# Patient Record
Sex: FEMALE | Race: WHITE
[De-identification: names, ages, dates, MRNs, and addresses within clinical notes are randomized per-mention and may not be internally consistent; named-entity substitution may affect disease eponyms.]

---

## 2017-04-25 ENCOUNTER — OTHER (OUTPATIENT)
Age: 37
End: 2017-04-25

## 2017-04-30 ENCOUNTER — RESULT REVIEW (OUTPATIENT)
Age: 37
End: 2017-04-30

## 2017-06-14 ENCOUNTER — APPOINTMENT (OUTPATIENT)
Dept: BREAST CENTER | Facility: CLINIC | Age: 37
End: 2017-06-14

## 2017-06-14 VITALS
BODY MASS INDEX: 19.32 KG/M2 | HEIGHT: 62 IN | DIASTOLIC BLOOD PRESSURE: 82 MMHG | SYSTOLIC BLOOD PRESSURE: 110 MMHG | HEART RATE: 90 BPM | WEIGHT: 105 LBS

## 2017-06-14 DIAGNOSIS — N60.19 DIFFUSE CYSTIC MASTOPATHY OF UNSPECIFIED BREAST: ICD-10-CM

## 2017-06-14 DIAGNOSIS — R92.8 OTHER ABNORMAL AND INCONCLUSIVE FINDINGS ON DIAGNOSTIC IMAGING OF BREAST: ICD-10-CM

## 2017-10-17 ENCOUNTER — OTHER (OUTPATIENT)
Age: 37
End: 2017-10-17

## 2017-11-20 ENCOUNTER — OTHER (OUTPATIENT)
Age: 37
End: 2017-11-20

## 2018-10-15 ENCOUNTER — OTHER (OUTPATIENT)
Age: 38
End: 2018-10-15

## 2020-06-05 ENCOUNTER — APPOINTMENT (OUTPATIENT)
Dept: PHYSICAL MEDICINE AND REHAB | Facility: CLINIC | Age: 40
End: 2020-06-05

## 2023-05-31 ENCOUNTER — FORM ENCOUNTER (OUTPATIENT)
Age: 43
End: 2023-05-31

## 2023-06-01 ENCOUNTER — APPOINTMENT (OUTPATIENT)
Dept: ORTHOPEDIC SURGERY | Facility: CLINIC | Age: 43
End: 2023-06-01
Payer: COMMERCIAL

## 2023-06-01 ENCOUNTER — APPOINTMENT (OUTPATIENT)
Dept: MRI IMAGING | Facility: CLINIC | Age: 43
End: 2023-06-01
Payer: COMMERCIAL

## 2023-06-01 VITALS — WEIGHT: 105 LBS | HEIGHT: 62 IN | BODY MASS INDEX: 19.32 KG/M2

## 2023-06-01 DIAGNOSIS — M54.16 RADICULOPATHY, LUMBAR REGION: ICD-10-CM

## 2023-06-01 PROCEDURE — 72100 X-RAY EXAM L-S SPINE 2/3 VWS: CPT

## 2023-06-01 PROCEDURE — 72141 MRI NECK SPINE W/O DYE: CPT

## 2023-06-01 PROCEDURE — 72040 X-RAY EXAM NECK SPINE 2-3 VW: CPT

## 2023-06-01 PROCEDURE — 99204 OFFICE O/P NEW MOD 45 MIN: CPT

## 2023-06-01 RX ORDER — METHYLPREDNISOLONE 4 MG/1
4 TABLET ORAL
Qty: 1 | Refills: 0 | Status: ACTIVE | COMMUNITY
Start: 2023-06-01 | End: 1900-01-01

## 2023-06-01 RX ORDER — CYCLOBENZAPRINE HYDROCHLORIDE 10 MG/1
10 TABLET, FILM COATED ORAL 3 TIMES DAILY
Qty: 60 | Refills: 0 | Status: ACTIVE | COMMUNITY
Start: 2023-06-01 | End: 1900-01-01

## 2023-06-02 ENCOUNTER — TRANSCRIPTION ENCOUNTER (OUTPATIENT)
Age: 43
End: 2023-06-02

## 2023-06-02 ENCOUNTER — APPOINTMENT (OUTPATIENT)
Dept: ORTHOPEDIC SURGERY | Facility: CLINIC | Age: 43
End: 2023-06-02

## 2023-06-02 NOTE — PHYSICAL EXAM
[Rotation to left] : rotation to left [Rotation to right] : rotation to right [4___] : left hip flexion 4[unfilled]/5 [de-identified] : Constitutional:\par - General Appearance:\par Unremarkable\par Body Habitus\par Well Developed\par Well Nourished\par Body Habitus\par No Deformities\par Well Groomed\par Ability To communicate:\par Normal\par Neurologic:\par Global sensation is intact to upper and lower extremities. See examination of Neck and/or Spine\par for exceptions.\par Orientation to Time, Place and Person is: Normal\par Mood And Affect is Normal\par Skin:\par - Head/Face, Right Upper/Lower Extremity, Left Upper/Lower Extremity: Normal\par See Examination of Neck and/or Spine for exceptions\par Cardiovascular:\par Peripheral Cardiovascular System is Normal\par Palpation of Lymph Nodes:\par Normal Palpation of lymph nodes in: Axilla, Cervical, Inguinal\par Abnormal Palpation of lymph nodes in: None  [FreeTextEntry9] : Full ROM w/ mild pain of L shoulder.  [TWNoteComboBox7] : forward flexion 20 degrees [de-identified] : extension 0 degrees [de-identified] : left lateral rotation 5 degrees [TWNoteComboBox6] : right lateral rotation 25 degrees [] : non-antalgic [FreeTextEntry8] : paraesthesias in L2 region b/l. negative cook b/l [de-identified] : +1 Achilles  [FreeTextEntry1] : On my interpretation of these images from [facility] [place] [facility] on 06/01/2023: Normal

## 2023-06-02 NOTE — ASSESSMENT
[FreeTextEntry1] : 42 y/o female with acute cervical radiculopathy, paraesthesias in left wrist extension and paraesthesias in LE. I am requesting a stat cervical MRI to evaluate for spinal cord compression as soon as possible, due to the patient experiencing severe neck pain, paraesthesias, and lower extremity weakness. I am providing the patient with a steroid pack prescription to help alleviate symptoms. Patient is unable to work with these symptoms present and I will provide her with a note to stay out of work until further notice. Follow up in 1 week to review MRI results.\par \par Prior to appointment and during encounter with patient extensive medical records were reviewed including but not limited to, hospital records, out patient records, imaging results, and lab data. During this appointment the patient was examined, diagnoses were discussed and explained in a face to face manner. In addition extensive time was spent reviewing aforementioned diagnostic studies. Counseling including abnormal image results, differential diagnoses, treatment options, risk and benefits, lifestyle changes, current condition, and current medications was performed. Patient's comments, questions, and concerns were address and patient verbalized understanding. Based on this patient's presentation at our office, which is an orthopedic spine surgeon's office, this patient inherently / intrinsically has a risk, however minute, of developing issues such as Cauda equina syndrome, bowel and bladder changes, or progression of motor or neurological deficits such as paralysis which may be permanent. \par \par REJI, Mery Molina, attest that this documentation has been prepared under the direction and in the presence of provider Rell Miguel MD.

## 2023-06-02 NOTE — REVIEW OF SYSTEMS
[Joint Pain] : joint pain [Joint Stiffness] : joint stiffness [FreeTextEntry9] : c , t & l-spine, b/l shoulder/ legs/feet

## 2023-06-02 NOTE — HISTORY OF PRESENT ILLNESS
[Neck] : neck [Upper back] : upper back [Sudden] : sudden [8] : 8 [Burning] : burning [Radiating] : radiating [Sharp] : sharp [Shooting] : shooting [Stabbing] : stabbing [Throbbing] : throbbing [Tightness] : tightness [Tingling] : tingling [Squeezing] : squeezing [Constant] : constant [Nothing helps with pain getting better] : Nothing helps with pain getting better [Full time] : Work status: full time [de-identified] : 06/01/2023: Patient presenting today for an initial evaluation. Patient reports she sneezed this morning and immediately experienced severe cervical and lumbar pain. Patient reports severe neck pain on a 10/10 scale, that travels across shoulders and reports paraesthesia in legs b/l since episode. No numbness, tingling or weakness in the lower and extremities b/l. Patient reports heavy sensation in legs b/l. Patient has been treating with meloxicam and lidocaine. 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

## 2023-06-07 ENCOUNTER — APPOINTMENT (OUTPATIENT)
Dept: ORTHOPEDIC SURGERY | Facility: CLINIC | Age: 43
End: 2023-06-07
Payer: COMMERCIAL

## 2023-06-07 VITALS — HEIGHT: 62 IN | WEIGHT: 105 LBS | BODY MASS INDEX: 19.32 KG/M2

## 2023-06-07 DIAGNOSIS — M50.20 OTHER CERVICAL DISC DISPLACEMENT, UNSPECIFIED CERVICAL REGION: ICD-10-CM

## 2023-06-07 DIAGNOSIS — M54.12 RADICULOPATHY, CERVICAL REGION: ICD-10-CM

## 2023-06-07 PROCEDURE — 99214 OFFICE O/P EST MOD 30 MIN: CPT

## 2023-06-12 NOTE — PHYSICAL EXAM
[Rotation to left] : rotation to left [Rotation to right] : rotation to right [4___] : left hip flexion 4[unfilled]/5 [NL (45)] : forward flexion 45 degrees [de-identified] : Constitutional:\par - General Appearance:\par Unremarkable\par Body Habitus\par Well Developed\par Well Nourished\par Body Habitus\par No Deformities\par Well Groomed\par Ability To communicate:\par Normal\par Neurologic:\par Global sensation is intact to upper and lower extremities. See examination of Neck and/or Spine\par for exceptions.\par Orientation to Time, Place and Person is: Normal\par Mood And Affect is Normal\par Skin:\par - Head/Face, Right Upper/Lower Extremity, Left Upper/Lower Extremity: Normal\par See Examination of Neck and/or Spine for exceptions\par Cardiovascular:\par Peripheral Cardiovascular System is Normal\par Palpation of Lymph Nodes:\par Normal Palpation of lymph nodes in: Axilla, Cervical, Inguinal\par Abnormal Palpation of lymph nodes in: None  [FreeTextEntry9] : Full ROM w/ mild pain of L shoulder.  [TWNoteComboBox7] : forward flexion 20 degrees [de-identified] : extension 30 degrees [de-identified] : left lateral rotation 25 degrees [TWNoteComboBox6] : right lateral rotation 45 degrees [] : non-antalgic [FreeTextEntry8] : paraesthesias in L2 region b/l. negative cook b/l [de-identified] : +1 Achilles  [FreeTextEntry1] : On my interpretation of these images from [facility] in [place] [facility] on 06/01/2023: Normal

## 2023-06-12 NOTE — HISTORY OF PRESENT ILLNESS
[4] : 4 [2] : 2 [Not working due to injury] : Work status: not working due to injury [de-identified] : 06/07/2023: Patient presenting today for a FUV. Patient reports symptoms have improved, but she is still unable to left/hold anything engaging cervical muscles. Patient reports cervical pain is worse in the morning and when moving. States symptoms are controlled at rest. Patient reports she did not continue to take steroid pack prescribed as it made her tired. Patient continues to be out of work at this time due to injury. \par \par 06/01/2023: Patient presenting today for an initial evaluation. Patient reports she sneezed this morning and immediately experienced severe cervical and lumbar pain. Patient reports severe neck pain on a 10/10 scale, that travels across shoulders and reports paraesthesia in legs b/l since episode. No numbness, tingling or weakness in the lower and extremities b/l. Patient reports heavy sensation in legs b/l. Patient has been treating with meloxicam and lidocaine. 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

## 2023-06-12 NOTE — DATA REVIEWED
[FreeTextEntry1] : On my interpretation of these images from [facility] on 06/01/2023: Cervical MRI\par C6-7 small central disc herniation

## 2023-07-19 ENCOUNTER — APPOINTMENT (OUTPATIENT)
Dept: ORTHOPEDIC SURGERY | Facility: CLINIC | Age: 43
End: 2023-07-19